# Patient Record
Sex: FEMALE | Race: WHITE
[De-identification: names, ages, dates, MRNs, and addresses within clinical notes are randomized per-mention and may not be internally consistent; named-entity substitution may affect disease eponyms.]

---

## 2021-09-09 ENCOUNTER — HOSPITAL ENCOUNTER (OUTPATIENT)
Age: 41
End: 2021-09-09
Payer: OTHER GOVERNMENT

## 2021-09-09 DIAGNOSIS — N83.8: Primary | ICD-10-CM

## 2021-09-09 LAB — CEA SERPL-MCNC: < 0.3 NG/ML (ref 0.1–3)

## 2021-09-09 PROCEDURE — 86304 IMMUNOASSAY TUMOR CA 125: CPT

## 2021-09-09 PROCEDURE — 82378 CARCINOEMBRYONIC ANTIGEN: CPT

## 2021-09-09 PROCEDURE — 36415 COLL VENOUS BLD VENIPUNCTURE: CPT

## 2021-09-09 PROCEDURE — 83615 LACTATE (LD) (LDH) ENZYME: CPT

## 2021-09-09 PROCEDURE — 82105 ALPHA-FETOPROTEIN SERUM: CPT

## 2021-09-09 PROCEDURE — 83520 IMMUNOASSAY QUANT NOS NONAB: CPT

## 2022-04-28 ENCOUNTER — HOSPITAL ENCOUNTER (OUTPATIENT)
Age: 42
End: 2022-04-28
Payer: OTHER GOVERNMENT

## 2022-04-28 DIAGNOSIS — Z12.31: Primary | ICD-10-CM

## 2022-04-28 DIAGNOSIS — Z80.3: ICD-10-CM

## 2022-04-28 PROCEDURE — 77067 SCR MAMMO BI INCL CAD: CPT

## 2022-04-28 PROCEDURE — 77063 BREAST TOMOSYNTHESIS BI: CPT

## 2023-03-21 ENCOUNTER — HOSPITAL ENCOUNTER (OUTPATIENT)
Age: 43
End: 2023-03-21
Payer: OTHER GOVERNMENT

## 2023-03-21 DIAGNOSIS — N92.6: Primary | ICD-10-CM

## 2023-03-21 PROCEDURE — 76856 US EXAM PELVIC COMPLETE: CPT

## 2023-03-21 PROCEDURE — 76830 TRANSVAGINAL US NON-OB: CPT

## 2023-08-31 ENCOUNTER — HOSPITAL ENCOUNTER (OUTPATIENT)
Age: 43
End: 2023-08-31
Payer: OTHER GOVERNMENT

## 2023-08-31 DIAGNOSIS — Z12.31: Primary | ICD-10-CM

## 2023-08-31 PROCEDURE — 77063 BREAST TOMOSYNTHESIS BI: CPT

## 2023-08-31 PROCEDURE — 77067 SCR MAMMO BI INCL CAD: CPT

## 2024-10-22 ENCOUNTER — HOSPITAL ENCOUNTER (OUTPATIENT)
Dept: HOSPITAL 73 - MAMMO | Age: 44
End: 2024-10-22
Payer: OTHER GOVERNMENT

## 2024-10-22 DIAGNOSIS — Z12.31: Primary | ICD-10-CM

## 2024-10-22 DIAGNOSIS — Z80.3: ICD-10-CM

## 2024-10-22 DIAGNOSIS — R92.333: ICD-10-CM

## 2024-10-22 PROCEDURE — 77063 BREAST TOMOSYNTHESIS BI: CPT

## 2024-10-22 PROCEDURE — 77067 SCR MAMMO BI INCL CAD: CPT

## 2024-10-22 NOTE — DI.MG.S_ITS
BILATERAL DIGITAL SCREENING MAMMOGRAM 3D/2D WITH CAD: 10/22/2024 
  
CLINICAL: Routine screening. Family history of breast cancer.   
  
Comparison is made to exams dated:  8/31/2023 mammogram and 4/28/2022 mammogram - [facility].   
  
The breasts are heterogeneously dense, which may obscure small masses (category c /  
51-75% glandular tissue).   
  
Current study was also evaluated with a Computer Aided Detection (CAD) system.   
No significant masses, calcifications, or other findings are seen in either breast.   
There has been no significant interval change. 
  
IMPRESSION: NEGATIVE 
There is no mammographic evidence of malignancy. A 1 year screening mammogram is  
recommended.   
  
Based on the Tyrer Cuzick model (a risk assessment model) the patient's lifetime risk is  
9.4% and her 10 year risk is 1.6%. According to the ACR, ACS, and NCCN guidelines, an  
annual breast MRI exam along with mammogram is recommended if the patient's lifetime risk  
is 20% or greater. 
  
  
This exam was interpreted at Station ID: [id number].   
  
NOTE: For mammograms, a report in lay terms will be sent to the patient. Approximately  
15% of breast malignancies will not be visualized mammographically. In the management of  
a palpable breast mass, a negative mammogram must not discourage biopsy of a clinically  
suspicious lesion. 
  
Electronically Signed By: Antonieta Metzger M.D., Ph.D.           
catrachito/gilbert:10/23/2024 01:48:53   
  
  
letter sent: Normal Exam   
ACR BI-RADS Category 1: Negative

## 2024-12-31 ENCOUNTER — HOSPITAL ENCOUNTER (EMERGENCY)
Age: 44
Discharge: HOME | End: 2024-12-31
Payer: OTHER GOVERNMENT

## 2024-12-31 VITALS
OXYGEN SATURATION: 97 % | RESPIRATION RATE: 18 BRPM | SYSTOLIC BLOOD PRESSURE: 136 MMHG | TEMPERATURE: 97.6 F | DIASTOLIC BLOOD PRESSURE: 62 MMHG | HEART RATE: 91 BPM

## 2024-12-31 VITALS
RESPIRATION RATE: 18 BRPM | HEART RATE: 89 BPM | DIASTOLIC BLOOD PRESSURE: 77 MMHG | OXYGEN SATURATION: 96 % | SYSTOLIC BLOOD PRESSURE: 119 MMHG

## 2024-12-31 VITALS — BODY MASS INDEX: 33 KG/M2

## 2024-12-31 DIAGNOSIS — J06.9: ICD-10-CM

## 2024-12-31 DIAGNOSIS — R21: Primary | ICD-10-CM

## 2024-12-31 LAB
FLUAV RNA UPPER RESP QL NAA+PROBE: (no result)
FLUBV RNA UPPER RESP QL NAA+PROBE: (no result)

## 2024-12-31 PROCEDURE — 99283 EMERGENCY DEPT VISIT LOW MDM: CPT

## 2024-12-31 PROCEDURE — 71046 X-RAY EXAM CHEST 2 VIEWS: CPT

## 2024-12-31 PROCEDURE — 0241U: CPT

## 2024-12-31 NOTE — ED.URI
HPI - URI/Sore Throat
<Libby Turner PA-C - Last Filed: 12/31/24 15:00>
General
Chief Complaint: Upper Respiratory Symptoms
Stated Complaint: coughing, vomiting, rash t-7
Time Seen by Provider: 12/31/24 13:49
Source: patient
Mode of arrival: Ambulatory
History of Present Illness
HPI Narrative: 
44-year-old female presents to the ED with 1 week of URI symptoms including fever, cough.  Patient states that this morning, she experienced an episode of vomiting, followed by all-over itchy rash.  Denies any new detergents, cosmetics, foods.  No 
tongue swelling, lip swelling, throat swelling.  No mucosal involvement of rash.  Patient states that other family members have been sick as well.
Related Data
Home Medications

 Medication  Instructions  Recorded  Confirmed
desvenlafaxine succinate 100 mg 100 mg PO DAILY 10/10/23 10/16/24
tablet,extended release 24 hr   
(Pristiq)   

Previous Rx's

 Medication  Instructions  Recorded
azithromycin 250 mg tablet See Rx Instructions PO .COMPLEX #6 12/31/24
(Zithromax Z-Vicente) tabs 
benzonatate 200 mg capsule 200 mg PO TID PRN cough #30 caps 12/31/24


Allergies

Allergy/AdvReac Type Severity Reaction Status Date / Time
No Known Drug Allergies Allergy   Unverified 10/16/24 11:13



Review of Systems
<Libby Turner PA-C - Last Filed: 12/31/24 15:00>
Constitutional
Constitutional: Denies chills, Denies fatigue, Reports fever(s), Denies frequent falls, Denies lethargy and Denies weakness
Eyes
Eyes: Denies change in vision, Denies eye discharge, Denies irritation and Denies loss of vision
ENT
Ears, Nose, Mouth, and Throat: Denies change in voice, Denies dizziness, Denies neck pain, Denies sore throat and Denies throat swelling
Cardiovascular
Cardiovascular: Denies chest pain, Denies irregular heart rhythm, Denies lightheadedness, Denies palpitations, Denies dyspnea, Denies dyspnea on exertion and Denies orthopnea
Respiratory
Respiratory: Reports cough, Denies dyspnea, Denies dyspnea on exertion and Denies wheezing
Gastrointestinal
Gastrointestinal: Denies abdominal pain, Denies change in bowel habits, Denies diarrhea, Reports nausea and Denies vomiting
Musculoskeletal
Musculoskeletal: Denies neck pain and Denies numbness
Integumentary/Breasts
Skin/Breast: Denies pruritus, Denies erythema, Reports rash and Denies wounds
Neurologic
Neurologic: Denies behavioral changes, Denies confusion, Denies dizziness, Denies frequent falls, Denies loss of vision, Denies numbness and Denies weakness
Psychiatric
Psychiatric: Denies anxiety, Denies behavioral changes, Denies confusion, Denies depression, Denies homicidal ideation and Denies suicidal ideation
Endocrine
Endocrine: Denies fatigue, Denies flushing and Denies palpitations
Hematologic/Lymphatic
Hematologic/Lymphatic: Denies easy bruising
Allergic/Immunologic
Allergic/Immunologic: Denies urticaria, Denies throat swelling and Denies wheezing

Patient History
<Libby Turner PA-C - Last Filed: 12/31/24 15:00>
Medical History (Updated 12/31/24 @ 14:55 by Libby Turner PA-C)

Dysmenorrhea
Metrorrhagia
Ovarian mass, right


Social History
Smoking Status:  Never smoker 


Smoking Status: Never smoker
Alcohol type: wine

Exam
<Libby Turner PA-C - Last Filed: 12/31/24 15:00>
Narrative
Exam Narrative: 

Const
General:?cooperative, healthy appearing and comfortable
[facility]
Head:?normal to inspection
 Ears:?hearing grossly normal bilaterally
 Nose:?external nose normal
 Face and sinus:?normal facial exam and sinuses nontender
 Mouth:?oral mucosae normal
 Throat:?posterior oropharynx normal
Eyes
General:?appearance normal, both eyes and all related structures
Neck
Neck:?normal visual inspection and no lymphadenopathy noted
Resp
Effort & Inspection:?normal respiratory effort
 Auscultation:? Coarse crackles in bilateral lower lobes.  No wheezing
Cardio
Rate:?regular rate
 Rhythm:?regular rhythm
Integumentary
Generalized rash consistent with hives.
Neuro
General:?patient alert, patient awake and patient oriented x3
Initial Vital Signs
Initial Vital Signs: 

Vital Signs

Temperature  97.6 F   12/31/24 13:25
Pulse Rate  91 H  12/31/24 13:25
Respiratory Rate  18   12/31/24 13:25
Blood Pressure  136/62   12/31/24 13:25
Pulse Oximetry  97   12/31/24 13:25
Oxygen Delivery Method  Room Air  12/31/24 13:25




<Edna Anne DO - Last Filed: 12/31/24 15:55>
Initial Vital Signs
Initial Vital Signs: 

Vital Signs

Temperature  97.6 F   12/31/24 13:25
Pulse Rate  91 H  12/31/24 13:25
Respiratory Rate  18   12/31/24 13:25
Blood Pressure  136/62   12/31/24 13:25
Pulse Oximetry  97   12/31/24 13:25
Oxygen Delivery Method  Room Air  12/31/24 13:25




Course
<Libby Turner PA-C - Last Filed: 12/31/24 15:00>
Orders
Ordered: 

ED Orders

12/31/24 13:56
Covid-19 + FLU A/B + RSV - PCR Stat 

12/31/24 14:11
CXR [XR chest 2V] Stat 





Discontinued Medications

Dexamethasone (Dexamethasone 10 Mg/Ml Vial)  10 mg PO NOW ONE
 Stop: 12/31/24 14:13
 Last Admin: 12/31/24 14:31  Dose: 10 mg
 Documented By: RL
Diphenhydramine HCl (Diphenhydramine 25 Mg Tablet)  25 mg PO NOW ONE
 Stop: 12/31/24 14:16
 Last Admin: 12/31/24 14:31  Dose: 25 mg
 Documented By: RL



Vital Signs
Vital signs: 

Vital Signs - 8 hr

 12/31/24
13:25 12/31/24
15:05
Temperature 97.6 F 
Pulse Rate 91 H 89
Respiratory Rate 18 18
Blood Pressure 136/62 119/77
Pulse Oximetry 97 96
Oxygen Delivery Method Room Air Room Air




<Edna Anne DO - Last Filed: 12/31/24 15:55>
Orders
Ordered: 

ED Orders

12/31/24 13:56
Covid-19 + FLU A/B + RSV - PCR Stat 

12/31/24 14:11
CXR [XR chest 2V] Stat 





Discontinued Medications

Dexamethasone (Dexamethasone 10 Mg/Ml Vial)  10 mg PO NOW ONE
 Stop: 12/31/24 14:13
 Last Admin: 12/31/24 14:31  Dose: 10 mg
 Documented By: RL
Diphenhydramine HCl (Diphenhydramine 25 Mg Tablet)  25 mg PO NOW ONE
 Stop: 12/31/24 14:16
 Last Admin: 12/31/24 14:31  Dose: 25 mg
 Documented By: RL



Vital Signs
Vital signs: 

Vital Signs - 8 hr

 12/31/24
13:25 12/31/24
15:05
Temperature 97.6 F 
Pulse Rate 91 H 89
Respiratory Rate 18 18
Blood Pressure 136/62 119/77
Pulse Oximetry 97 96
Oxygen Delivery Method Room Air Room Air




MDM - URI/Sore Throat
<Libby Turner PA-C - Last Filed: 12/31/24 15:00>
Lab Data
Labs: 

Lab Results

  12/31/24 Range/Units
  13:56 
SARS-CoV-2 (PCR)  Negative  (Negative)  
Influenza A (RT-PCR)  Flu a negative  (NEGATIVE)  
Influenza B (RT-PCR)  Flu b negative  (NEGATIVE)  
RSV (PCR)  Negative  (Negative)  



MDM Narrative
Medical decision making narrative: 
44-year-old female presents to the ED with 1 week of URI symptoms including fever, cough.  Concern for URI versus pneumonia versus post viral rash versus allergic rash versus other.  Will give Benadryl, dexamethasone.  Will obtain chest x-ray, 
respiratory four panel.  Will reassess.

Respiratory panel negative for COVID, influenza, RSV.  Possibly some other URI.  Chest x-ray without acute findings.  Will cover with a Z-Vicente in case patient has mycoplasma.  Patient responded well to the dexamethasone and Benadryl.  Her rash has 
faded significantly.  Patient may continue to take Benadryl at home.  Prescribed Tessalon Perles for cough.  ED return precautions were discussed with patient.  Patient verbalized understanding.  

Medical records reviewed: Yes

<Edna Anne DO - Last Filed: 12/31/24 15:55>
Lab Data
Labs: 

Lab Results

  12/31/24 Range/Units
  13:56 
SARS-CoV-2 (PCR)  Negative  (Negative)  
Influenza A (RT-PCR)  Flu a negative  (NEGATIVE)  
Influenza B (RT-PCR)  Flu b negative  (NEGATIVE)  
RSV (PCR)  Negative  (Negative)  




Discharge Plan
Departure
Patient Disposition: Home

Clinical Impression:
 Rash

URI (upper respiratory infection)
Qualifiers:
 URI type: unspecified viral URI Qualified Code(s): J06.9 - Acute upper respiratory infection, unspecified


Instructions:  DI for Viral Upper Respiratory Infection -- Adult

Activity Restrictions/Additional Instructions:
Were evaluated in the ED today for a rash, cough, fever.  Tested negative for COVID-19, influenza, RSV.  You likely have some other viral infection that is causing your symptoms.  It is very common for viral infections to cause a rash.  You were 
given some dexamethasone which is a steroid and Benadryl for the rash.  Your chest x-ray was normal as well.  You are being prescribed antibiotics and cough medication.  Return to the ED if you have worsening symptoms, chest pain, shortness of 
breath.

Prescriptions:
New
  azithromycin [Zithromax Z-Vicente] 250 mg tablet 
   See Rx Instructions  .ROUTE .COMPLEX Qty: 6 0RF
   Rx Instructions:
   For 250 mg dose pack: take 500 mg today (day 1), then 250 mg for 4 days (days 2-5)
  benzonatate 200 mg capsule 
   200 mg PO TID PRN (Reason: cough) Qty: 30 0RF

No Action
  desvenlafaxine succinate [Pristiq] 100 mg tablet extended release 24 hr 
   100 mg PO DAILY 

Referrals:
Vikas Banda MD [Primary Care Provider] - 

Stand Alone Forms:  Patient Portal/API/Survey


ED Sign-out
<Edna Anne DO - Last Filed: 12/31/24 15:55>
Cosign
ED Attending [place] Attestation: 
I was available for consultation.

## 2024-12-31 NOTE — DI.RAD.S_ITS
PROCEDURE:  XR CHEST 2V 
  
INDICATIONS:  cough, fever 
  
TECHNIQUE:  2 views of the chest were acquired.   
  
COMPARISON:  None. 
  
FINDINGS:   
  
Surgical changes and devices:  None.   
  
Lungs and pleura:  Lungs are clear.  No pleural effusions or pneumothorax.   
  
Mediastinum:  Mediastinal contours are normal.  Heart size is normal.   
  
Bones and chest wall:  No suspicious bony abnormalities.  Soft tissues appear  
unremarkable.   
  
  
IMPRESSION:   
  
No acute cardiopulmonary abnormality is seen.  
  
  
Dictated by: Ryley Pardo M.D. on 12/31/2024 at 14:46      
Approved by: Ryley Pardo M.D. on 12/31/2024 at 14:46

## 2024-12-31 NOTE — ED_ITS
HPI - URI/Sore Throat    
<Libby Turner PA-C - Last Filed: 12/31/24 15:00>    
General    
Chief Complaint: Upper Respiratory Symptoms    
Stated Complaint: coughing, vomiting, rash t-7    
Time Seen by Provider: 12/31/24 13:49    
Source: patient    
Mode of arrival: Ambulatory    
History of Present Illness    
HPI Narrative:     
44-year-old female presents to the ED with 1 week of URI symptoms including   
fever, cough.  Patient states that this morning, she experienced an episode of   
vomiting, followed by all-over itchy rash.  Denies any new detergents,   
cosmetics, foods.  No tongue swelling, lip swelling, throat swelling.  No   
mucosal involvement of rash.  Patient states that other family members have been  
sick as well.    
Related Data    
                                Home Medications    
    
    
    
 Medication  Instructions  Recorded  Confirmed    
     
desvenlafaxine succinate 100 mg 100 mg PO DAILY 10/10/23 10/16/24    
    
tablet,extended release 24 hr       
    
(Pristiq)       
    
    
                                  Previous Rx's    
    
    
    
 Medication  Instructions  Recorded    
     
azithromycin 250 mg tablet See Rx Instructions PO .COMPLEX #6 12/31/24    
    
(Zithromax Z-Vicente) tabs     
     
benzonatate 200 mg capsule 200 mg PO TID PRN cough #30 caps 12/31/24    
    
    
    
                                    Allergies    
    
    
    
Allergy/AdvReac Type Severity Reaction Status Date / Time    
     
No Known Drug Allergies Allergy   Unverified 10/16/24 11:13    
    
    
    
    
Review of Systems    
<Libby Turner PA-C - Last Filed: 12/31/24 15:00>    
Constitutional    
Constitutional: Denies chills, Denies fatigue, Reports fever(s), Denies frequent  
falls, Denies lethargy and Denies weakness    
Eyes    
Eyes: Denies change in vision, Denies eye discharge, Denies irritation and   
Denies loss of vision    
ENT    
Ears, Nose, Mouth, and Throat: Denies change in voice, Denies dizziness, Denies   
neck pain, Denies sore throat and Denies throat swelling    
Cardiovascular    
Cardiovascular: Denies chest pain, Denies irregular heart rhythm, Denies   
lightheadedness, Denies palpitations, Denies dyspnea, Denies dyspnea on exertion  
and Denies orthopnea    
Respiratory    
Respiratory: Reports cough, Denies dyspnea, Denies dyspnea on exertion and   
Denies wheezing    
Gastrointestinal    
Gastrointestinal: Denies abdominal pain, Denies change in bowel habits, Denies   
diarrhea, Reports nausea and Denies vomiting    
Musculoskeletal    
Musculoskeletal: Denies neck pain and Denies numbness    
Integumentary/Breasts    
Skin/Breast: Denies pruritus, Denies erythema, Reports rash and Denies wounds    
Neurologic    
Neurologic: Denies behavioral changes, Denies confusion, Denies dizziness,   
Denies frequent falls, Denies loss of vision, Denies numbness and Denies   
weakness    
Psychiatric    
Psychiatric: Denies anxiety, Denies behavioral changes, Denies confusion, Denies  
depression, Denies homicidal ideation and Denies suicidal ideation    
Endocrine    
Endocrine: Denies fatigue, Denies flushing and Denies palpitations    
Hematologic/Lymphatic    
Hematologic/Lymphatic: Denies easy bruising    
Allergic/Immunologic    
Allergic/Immunologic: Denies urticaria, Denies throat swelling and Denies   
wheezing    
    
Patient History    
<Libby Turner PA-C - Last Filed: 12/31/24 15:00>    
Medical History (Updated 12/31/24 @ 14:55 by Libby Turner PA-C)    
    
Dysmenorrhea    
Metrorrhagia    
Ovarian mass, right    
    
    
Social History    
Smoking Status:  Never smoker     
    
    
Smoking Status: Never smoker    
Alcohol type: wine    
    
Exam    
<Libby Turner PA-C - Last Filed: 12/31/24 15:00>    
Narrative    
Exam Narrative:     
    
Const    
General:?cooperative, healthy appearing and comfortable    
[place]    
Head:?normal to inspection    
 Ears:?hearing grossly normal bilaterally    
 Nose:?external nose normal    
 Face and sinus:?normal facial exam and sinuses nontender    
 Mouth:?oral mucosae normal    
 Throat:?posterior oropharynx normal    
Eyes    
General:?appearance normal, both eyes and all related structures    
Neck    
Neck:?normal visual inspection and no lymphadenopathy noted    
Resp    
Effort & Inspection:?normal respiratory effort    
 Auscultation:? Coarse crackles in bilateral lower lobes.  No wheezing    
Cardio    
Rate:?regular rate    
 Rhythm:?regular rhythm    
Integumentary    
Generalized rash consistent with hives.    
Neuro    
General:?patient alert, patient awake and patient oriented x3    
Initial Vital Signs    
Initial Vital Signs:     
    
Vital Signs    
    
    
    
Temperature  97.6 F   12/31/24 13:25    
     
Pulse Rate  91 H  12/31/24 13:25    
     
Respiratory Rate  18   12/31/24 13:25    
     
Blood Pressure  136/62   12/31/24 13:25    
     
Pulse Oximetry  97   12/31/24 13:25    
     
Oxygen Delivery Method  Room Air  12/31/24 13:25    
    
    
    
    
    
<Edna Anne DO - Last Filed: 12/31/24 15:55>    
Initial Vital Signs    
Initial Vital Signs:     
    
Vital Signs    
    
    
    
Temperature  97.6 F   12/31/24 13:25    
     
Pulse Rate  91 H  12/31/24 13:25    
     
Respiratory Rate  18   12/31/24 13:25    
     
Blood Pressure  136/62   12/31/24 13:25    
     
Pulse Oximetry  97   12/31/24 13:25    
     
Oxygen Delivery Method  Room Air  12/31/24 13:25    
    
    
    
    
    
Course    
<Libby Turner PA-C - Last Filed: 12/31/24 15:00>    
Orders    
Ordered:     
    
                                    ED Orders    
    
12/31/24 13:56    
Covid-19 + FLU A/B + RSV - PCR Stat     
    
12/31/24 14:11    
CXR [XR chest 2V] Stat     
    
    
                                            
    
    
Discontinued Medications    
    
Dexamethasone (Dexamethasone 10 Mg/Ml Vial)  10 mg PO NOW ONE    
   Stop: 12/31/24 14:13    
   Last Admin: 12/31/24 14:31  Dose: 10 mg    
   Documented By: RL    
Diphenhydramine HCl (Diphenhydramine 25 Mg Tablet)  25 mg PO NOW ONE    
   Stop: 12/31/24 14:16    
   Last Admin: 12/31/24 14:31  Dose: 25 mg    
   Documented By: RL    
    
    
    
Vital Signs    
Vital signs:     
    
                               Vital Signs - 8 hr    
    
    
    
 12/31/24    
13:25 12/31/24    
15:05    
     
Temperature 97.6 F     
     
Pulse Rate 91 H 89    
     
Respiratory Rate 18 18    
     
Blood Pressure 136/62 119/77    
     
Pulse Oximetry 97 96    
     
Oxygen Delivery Method Room Air Room Air    
    
    
    
    
    
<Edna Anne DO - Last Filed: 12/31/24 15:55>    
Orders    
Ordered:     
    
                                    ED Orders    
    
12/31/24 13:56    
Covid-19 + FLU A/B + RSV - PCR Stat     
    
12/31/24 14:11    
CXR [XR chest 2V] Stat     
    
    
                                            
    
    
Discontinued Medications    
    
Dexamethasone (Dexamethasone 10 Mg/Ml Vial)  10 mg PO NOW ONE    
   Stop: 12/31/24 14:13    
   Last Admin: 12/31/24 14:31  Dose: 10 mg    
   Documented By: RL    
Diphenhydramine HCl (Diphenhydramine 25 Mg Tablet)  25 mg PO NOW ONE    
   Stop: 12/31/24 14:16    
   Last Admin: 12/31/24 14:31  Dose: 25 mg    
   Documented By: RL    
    
    
    
Vital Signs    
Vital signs:     
    
                               Vital Signs - 8 hr    
    
    
    
 12/31/24    
13:25 12/31/24    
15:05    
     
Temperature 97.6 F     
     
Pulse Rate 91 H 89    
     
Respiratory Rate 18 18    
     
Blood Pressure 136/62 119/77    
     
Pulse Oximetry 97 96    
     
Oxygen Delivery Method Room Air Room Air    
    
    
    
    
    
MDM - URI/Sore Throat    
<Libby Turner PA-C - Last Filed: 12/31/24 15:00>    
Lab Data    
Labs:     
    
                                   Lab Results    
    
    
    
  12/31/24 Range/Units    
    
  13:56     
     
SARS-CoV-2 (PCR)  Negative  (Negative)      
     
Influenza A (RT-PCR)  Flu a negative  (NEGATIVE)      
     
Influenza B (RT-PCR)  Flu b negative  (NEGATIVE)      
     
RSV (PCR)  Negative  (Negative)      
    
    
    
    
MDM Narrative    
Medical decision making narrative:     
44-year-old female presents to the ED with 1 week of URI symptoms including   
fever, cough.  Concern for URI versus pneumonia versus post viral rash versus   
allergic rash versus other.  Will give Benadryl, dexamethasone.  Will obtain   
chest x-ray, respiratory four panel.  Will reassess.    
    
Respiratory panel negative for COVID, influenza, RSV.  Possibly some other URI.   
Chest x-ray without acute findings.  Will cover with a Z-Vicente in case patient has  
mycoplasma.  Patient responded well to the dexamethasone and Benadryl.  Her rash  
has faded significantly.  Patient may continue to take Benadryl at home.    
Prescribed Tessalon Perles for cough.  ED return precautions were discussed with  
patient.  Patient verbalized understanding.      
    
Medical records reviewed: Yes    
    
<Edna Anne DO - Last Filed: 12/31/24 15:55>    
Lab Data    
Labs:     
    
                                   Lab Results    
    
    
    
  12/31/24 Range/Units    
    
  13:56     
     
SARS-CoV-2 (PCR)  Negative  (Negative)      
     
Influenza A (RT-PCR)  Flu a negative  (NEGATIVE)      
     
Influenza B (RT-PCR)  Flu b negative  (NEGATIVE)      
     
RSV (PCR)  Negative  (Negative)      
    
    
    
    
    
Discharge Plan    
Departure    
Patient Disposition: Home    
    
Clinical Impression:    
 Rash    
    
URI (upper respiratory infection)    
Qualifiers:    
 URI type: unspecified viral URI Qualified Code(s): J06.9 - Acute upper   
respiratory infection, unspecified    
    
    
Instructions:  DI for Viral Upper Respiratory Infection -- Adult    
    
Activity Restrictions/Additional Instructions:    
Were evaluated in the ED today for a rash, cough, fever.  Tested negative for   
COVID-19, influenza, RSV.  You likely have some other viral infection that is   
causing your symptoms.  It is very common for viral infections to cause a rash.   
You were given some dexamethasone which is a steroid and Benadryl for the rash.   
Your chest x-ray was normal as well.  You are being prescribed antibiotics and   
cough medication.  Return to the ED if you have worsening symptoms, chest pain,   
shortness of breath.    
    
Prescriptions:    
New    
  azithromycin [Zithromax Z-Vicente] 250 mg tablet     
   See Rx Instructions  .ROUTE .COMPLEX Qty: 6 0RF    
   Rx Instructions:    
   For 250 mg dose pack: take 500 mg today (day 1), then 250 mg for 4 days (days  
2-5)    
  benzonatate 200 mg capsule     
   200 mg PO TID PRN (Reason: cough) Qty: 30 0RF    
    
No Action    
  desvenlafaxine succinate [Pristiq] 100 mg tablet extended release 24 hr     
   100 mg PO DAILY     
    
Referrals:    
Vikas Banda MD [Primary Care Provider] -     
    
Stand Alone Forms:  Patient Portal/API/Survey    
    
    
ED Sign-out    
<Edna Anne DO - Last Filed: 12/31/24 15:55>    
Cosign    
ED Attending [place] Attestation:     
I was available for consultation.

## 2025-01-03 ENCOUNTER — HOSPITAL ENCOUNTER (EMERGENCY)
Age: 45
Discharge: LEFT BEFORE DISCHARGE | End: 2025-01-03
Payer: OTHER GOVERNMENT

## 2025-01-03 VITALS — DIASTOLIC BLOOD PRESSURE: 71 MMHG | OXYGEN SATURATION: 95 % | SYSTOLIC BLOOD PRESSURE: 153 MMHG | HEART RATE: 88 BPM

## 2025-01-03 VITALS
OXYGEN SATURATION: 95 % | RESPIRATION RATE: 20 BRPM | DIASTOLIC BLOOD PRESSURE: 65 MMHG | HEART RATE: 89 BPM | SYSTOLIC BLOOD PRESSURE: 141 MMHG

## 2025-01-03 VITALS
OXYGEN SATURATION: 95 % | RESPIRATION RATE: 16 BRPM | TEMPERATURE: 100.4 F | SYSTOLIC BLOOD PRESSURE: 131 MMHG | DIASTOLIC BLOOD PRESSURE: 79 MMHG | HEART RATE: 100 BPM

## 2025-01-03 VITALS — BODY MASS INDEX: 26.6 KG/M2

## 2025-01-03 DIAGNOSIS — R11.10: Primary | ICD-10-CM

## 2025-01-03 PROCEDURE — 96360 HYDRATION IV INFUSION INIT: CPT

## 2025-01-03 PROCEDURE — 36415 COLL VENOUS BLD VENIPUNCTURE: CPT

## 2025-01-03 PROCEDURE — 99284 EMERGENCY DEPT VISIT MOD MDM: CPT
